# Patient Record
Sex: MALE
[De-identification: names, ages, dates, MRNs, and addresses within clinical notes are randomized per-mention and may not be internally consistent; named-entity substitution may affect disease eponyms.]

---
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
## 2023-01-08 NOTE — PATIENT PROFILE PEDIATRIC. - HOW PATIENT ADDRESSED, PROFILE
AVS reviewed with pt. All questions answered to best of this RN's abilities. R chest port flushed with heparin and removed from pt. Pt taken off of Tele. Pt taken downstairs via wheelchair with belongings at side to go home with . Sonia

## 2024-01-13 NOTE — ED PROVIDER NOTE - NSCAREINITIATED _GEN_ER
Mindy Barone(Attending) Pt presents for chest pain worse when lying down. Pt was seen and treated for urine infection on Thursday here at [facility]. Denies Phx Pt presents for chest pain worse when lying down. Pt was seen and treated for urine infection on Thursday here at [facility]. Denies Phx